# Patient Record
Sex: FEMALE | Race: WHITE | NOT HISPANIC OR LATINO | Employment: OTHER | URBAN - METROPOLITAN AREA
[De-identification: names, ages, dates, MRNs, and addresses within clinical notes are randomized per-mention and may not be internally consistent; named-entity substitution may affect disease eponyms.]

---

## 2017-05-04 ENCOUNTER — TRANSCRIBE ORDERS (OUTPATIENT)
Dept: ADMINISTRATIVE | Facility: HOSPITAL | Age: 82
End: 2017-05-04

## 2017-05-17 ENCOUNTER — GENERIC CONVERSION - ENCOUNTER (OUTPATIENT)
Dept: OTHER | Facility: OTHER | Age: 82
End: 2017-05-17

## 2017-05-17 DIAGNOSIS — I70.25 ATHEROSCLEROSIS OF NATIVE ARTERIES OF OTHER EXTREMITIES WITH ULCERATION (HCC): ICD-10-CM

## 2017-05-18 ENCOUNTER — HOSPITAL ENCOUNTER (OUTPATIENT)
Dept: NON INVASIVE DIAGNOSTICS | Facility: HOSPITAL | Age: 82
Discharge: HOME | End: 2017-05-18
Attending: SURGERY
Payer: MEDICARE

## 2017-05-18 DIAGNOSIS — I70.25 ATHEROSCLEROSIS OF NATIVE ARTERIES OF OTHER EXTREMITIES WITH ULCERATION (HCC): ICD-10-CM

## 2017-05-18 PROCEDURE — 93925 LOWER EXTREMITY STUDY: CPT

## 2017-05-18 PROCEDURE — 93923 UPR/LXTR ART STDY 3+ LVLS: CPT

## 2017-06-01 ENCOUNTER — GENERIC CONVERSION - ENCOUNTER (OUTPATIENT)
Dept: OTHER | Facility: OTHER | Age: 82
End: 2017-06-01

## 2017-07-03 ENCOUNTER — GENERIC CONVERSION - ENCOUNTER (OUTPATIENT)
Dept: OTHER | Facility: OTHER | Age: 82
End: 2017-07-03

## 2017-07-05 ENCOUNTER — GENERIC CONVERSION - ENCOUNTER (OUTPATIENT)
Dept: OTHER | Facility: OTHER | Age: 82
End: 2017-07-05

## 2017-07-24 ENCOUNTER — ALLSCRIPTS OFFICE VISIT (OUTPATIENT)
Dept: OTHER | Facility: OTHER | Age: 82
End: 2017-07-24

## 2017-11-24 DIAGNOSIS — I70.25 ATHEROSCLEROSIS OF NATIVE ARTERIES OF OTHER EXTREMITIES WITH ULCERATION (HCC): ICD-10-CM

## 2017-12-07 ENCOUNTER — GENERIC CONVERSION - ENCOUNTER (OUTPATIENT)
Dept: OTHER | Facility: OTHER | Age: 82
End: 2017-12-07

## 2017-12-07 ENCOUNTER — HOSPITAL ENCOUNTER (OUTPATIENT)
Dept: NON INVASIVE DIAGNOSTICS | Facility: HOSPITAL | Age: 82
Discharge: HOME | End: 2017-12-07
Attending: SURGERY
Payer: MEDICARE

## 2017-12-07 DIAGNOSIS — I70.25 ATHEROSCLEROSIS OF NATIVE ARTERIES OF OTHER EXTREMITIES WITH ULCERATION (HCC): ICD-10-CM

## 2017-12-07 PROCEDURE — 93923 UPR/LXTR ART STDY 3+ LVLS: CPT

## 2017-12-07 PROCEDURE — 93925 LOWER EXTREMITY STUDY: CPT

## 2018-01-11 NOTE — MISCELLANEOUS
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 (440 23,707 9) (I70 25)   3  Carotid artery stenosis (433 10) (I65 29)   4  Hypertension (401 9) (I10)   5  Hypothyroidism (244 9) (E03 9)   6  Peripheral vascular disease (443 9) (I73 9)   7  Vulvar lesion (624 8) (N90 89)    Current Meds   1  B-12 TABS; Therapy: (Kennth Buttery) to Recorded   2  Calcium 600 MG Oral Tablet; Therapy: (Kennth Buttery) to Recorded   3  Coumadin 5 MG Oral Tablet (Warfarin Sodium); Therapy: (Kennth Buttery) to Recorded   4  [place] TABS; Therapy: (Kennth Buttery) to Recorded   5  Multi-Vitamin TABS; Therapy: (Kennth Buttery) to Recorded   6  OxyCODONE HCl - 5 MG Oral Tablet; Therapy: (Kennth Buttery) to Recorded   7  Plavix 75 MG Oral Tablet (Clopidogrel Bisulfate); TAKE 1 TABLET DAILY; Therapy: (Kennth Buttery) to Recorded   8  PriLOSEC OTC 20 MG Oral Tablet Delayed Release; Therapy: (Kennth Buttery) to Recorded   9  Synthroid 50 MCG Oral Tablet (Levothyroxine Sodium); Therapy: (Kennth Buttery) to Recorded   10  Vitamin C 500 MG Oral Tablet; Therapy: (Kennth Buttery) to Recorded   11  Vitamin E 400 UNIT Oral Capsule; Therapy: (Kennth Buttery) to Recorded    Allergies    1  No Known Drug Allergies    Plan  Atherosclerosis of native artery of other extremity with ulceration    · VAS LOWER LIMB ARTERIAL DUPLEX, COMPLETE BILATERAL/GRAFTS;  SIDE:Bilateral; Status:Hold For - Scheduling,Retrospective By Protocol Authorization;   Requested for:[id number]; Signatures   Electronically signed by :  Donte Grace, ; May 17 2017  3:04PM EST                       (Author)

## 2018-01-11 NOTE — MISCELLANEOUS
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 (440 23,707 9) (I70 25)   3  Carotid artery stenosis (433 10) (I65 29)   4  Hypertension (401 9) (I10)   5  Hypothyroidism (244 9) (E03 9)   6  Peripheral vascular disease (443 9) (I73 9)   7  Vulvar lesion (624 8) (N90 89)    Current Meds   1  Aspirin 81 MG Oral Tablet Delayed Release; Therapy: (Recorded:01Jun2017) to Recorded   2  Calcium 500 TABS; Therapy: (Recorded:01Jun2017) to Recorded   3  Coumadin 2 5 MG Oral Tablet (Warfarin Sodium); Therapy: (Recorded:01Jun2017) to Recorded   4  Coumadin 5 MG Oral Tablet (Warfarin Sodium); Therapy: (Recorded:01Jun2017) to Recorded   5  Lexapro 5 MG Oral Tablet (Escitalopram Oxalate); Therapy: (Recorded:01Jun2017) to Recorded   6  Multi-Vitamin TABS; Therapy: (Nicholaus December) to Recorded   7  Omeprazole 20 MG Oral Capsule Delayed Release; Therapy: (Recorded:01Jun2017) to Recorded   8  Refresh SOLN;   Therapy: (Recorded:01Jun2017) to Recorded   9  Synthroid 50 MCG Oral Tablet (Levothyroxine Sodium); Therapy: (Recorded:01Jun2017) to Recorded   10  Ventolin 90 MCG/ACT AERS (Albuterol); Therapy: (Recorded:01Jun2017) to Recorded   11  Vitamin C 500 MG Oral Tablet; Therapy: (Nicholaus December) to Recorded   12  Vitamin E 400 UNIT Oral Capsule; Therapy: (Nicholaus December) to Recorded    Allergies    1  No Known Drug Allergies    Signatures   Electronically signed by :  Renate Mendoza, ; Jul 5 2017  3:56PM EST                       (Author)

## 2018-01-14 VITALS
HEART RATE: 76 BPM | WEIGHT: 107 LBS | BODY MASS INDEX: 18.96 KG/M2 | HEIGHT: 63 IN | DIASTOLIC BLOOD PRESSURE: 76 MMHG | RESPIRATION RATE: 16 BRPM | SYSTOLIC BLOOD PRESSURE: 116 MMHG

## 2018-01-14 NOTE — MISCELLANEOUS
Message   Recorded as Task   Date: 06/07/2016 01:17 PM, Created By: Nadira Han   Task Name: Follow Up   Assigned To: Herrera Rizzo   Regarding Patient: Stefani Monte, Status: Active   CommentAylin Peng - 07 Jun 2016 1:17 PM     TASK CREATED  Looks like she may have been on Plavix long term  She has a hx of falls and PCP is asking if we can dc Plavix? Pt is also on coumadin but for a-fib  Thanks! 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 (440 23,707 9) (I70 25)   3  Carotid artery stenosis (433 10) (I65 29)   4  Hypertension (401 9) (I10)   5  Hypothyroidism (244 9) (E03 9)   6  Peripheral vascular disease (443 9) (I73 9)    Current Meds   1  B-12 TABS; Therapy: (Enrikeie Ni) to Recorded   2  Calcium 600 MG Oral Tablet; Therapy: (Enrikeie Ni) to Recorded   3  Coumadin 5 MG Oral Tablet (Warfarin Sodium); Therapy: (Lossie Ni) to Recorded   4  [place] TABS; Therapy: (Lossie Ni) to Recorded   5  Multi-Vitamin TABS; Therapy: (Lossie Ni) to Recorded   6  OxyCODONE HCl - 5 MG Oral Tablet; Therapy: (Lossie Ni) to Recorded   7  Plavix 75 MG Oral Tablet (Clopidogrel Bisulfate); TAKE 1 TABLET DAILY; Therapy: (Lossie Ni) to Recorded   8  PriLOSEC OTC 20 MG Oral Tablet Delayed Release; Therapy: (Lossie Ni) to Recorded   9  Synthroid 50 MCG Oral Tablet (Levothyroxine Sodium); Therapy: (Terri Eckert) to Recorded   10  Vitamin C 500 MG Oral Tablet; Therapy: (Terri Eckert) to Recorded   11  Vitamin E 400 UNIT Oral Capsule; Therapy: (Terri Eckert) to Recorded    Allergies    1   No Known Drug Allergies    Signatures   Electronically signed by : Brittaney Urbina, ; Jun 14 2016  9:09AM EST                       (Author)

## 2018-01-18 NOTE — MISCELLANEOUS
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 (440 23,707 9) (I70 25)   3  Carotid artery stenosis (433 10) (I65 29)   4  Hypertension (401 9) (I10)   5  Hypothyroidism (244 9) (E03 9)   6  Peripheral vascular disease (443 9) (I73 9)   7  Vulvar lesion (624 8) (N90 89)    Current Meds   1  Aspirin 81 MG Oral Tablet Delayed Release; Therapy: (Recorded:01Jun2017) to Recorded   2  Calcium 500 TABS; Therapy: (Recorded:01Jun2017) to Recorded   3  Coumadin 2 5 MG Oral Tablet (Warfarin Sodium); Therapy: (Recorded:01Jun2017) to Recorded   4  Coumadin 5 MG Oral Tablet (Warfarin Sodium); Therapy: (Recorded:01Jun2017) to Recorded   5  Lexapro 5 MG Oral Tablet (Escitalopram Oxalate); Therapy: (Recorded:01Jun2017) to Recorded   6  Multi-Vitamin TABS; Therapy: ([place] Drop) to Recorded   7  Omeprazole 20 MG Oral Capsule Delayed Release; Therapy: (Recorded:01Jun2017) to Recorded   8  Refresh SOLN;   Therapy: (Recorded:01Jun2017) to Recorded   9  Synthroid 50 MCG Oral Tablet (Levothyroxine Sodium); Therapy: (Recorded:01Jun2017) to Recorded   10  Ventolin 90 MCG/ACT AERS (Albuterol); Therapy: (Recorded:01Jun2017) to Recorded   11  Vitamin C 500 MG Oral Tablet; Therapy: (Ramon Drop) to Recorded   12  Vitamin E 400 UNIT Oral Capsule; Therapy: (Ramon Drop) to Recorded    Allergies    1  No Known Drug Allergies    Signatures   Electronically signed by :  Gena Grace, ; Jul  3 2017  4:31PM EST                       (Author)

## 2018-01-22 VITALS
HEART RATE: 66 BPM | DIASTOLIC BLOOD PRESSURE: 80 MMHG | BODY MASS INDEX: 18.25 KG/M2 | HEIGHT: 63 IN | SYSTOLIC BLOOD PRESSURE: 120 MMHG | WEIGHT: 103 LBS | RESPIRATION RATE: 14 BRPM

## 2018-12-21 ENCOUNTER — TELEPHONE (OUTPATIENT)
Dept: ADMINISTRATIVE | Facility: HOSPITAL | Age: 83
End: 2018-12-21

## 2018-12-21 DIAGNOSIS — I70.232 ATHEROSCLEROSIS OF NATIVE ARTERY OF BOTH LOWER EXTREMITIES WITH BILATERAL ULCERATION OF CALVES (HCC): Primary | ICD-10-CM

## 2018-12-21 DIAGNOSIS — I70.242 ATHEROSCLEROSIS OF NATIVE ARTERY OF BOTH LOWER EXTREMITIES WITH BILATERAL ULCERATION OF CALVES (HCC): Primary | ICD-10-CM

## 2019-03-26 ENCOUNTER — HOSPITAL ENCOUNTER (OUTPATIENT)
Dept: NON INVASIVE DIAGNOSTICS | Facility: CLINIC | Age: 84
Discharge: HOME | End: 2019-03-26
Payer: MEDICARE

## 2019-03-26 DIAGNOSIS — I70.242 ATHEROSCLEROSIS OF NATIVE ARTERY OF BOTH LOWER EXTREMITIES WITH BILATERAL ULCERATION OF CALVES (HCC): ICD-10-CM

## 2019-03-26 DIAGNOSIS — I70.232 ATHEROSCLEROSIS OF NATIVE ARTERY OF BOTH LOWER EXTREMITIES WITH BILATERAL ULCERATION OF CALVES (HCC): ICD-10-CM

## 2019-03-26 PROCEDURE — 93925 LOWER EXTREMITY STUDY: CPT

## 2019-03-26 PROCEDURE — 93923 UPR/LXTR ART STDY 3+ LVLS: CPT

## 2019-03-28 PROCEDURE — 93925 LOWER EXTREMITY STUDY: CPT | Performed by: SURGERY

## 2019-03-28 PROCEDURE — 93922 UPR/L XTREMITY ART 2 LEVELS: CPT | Performed by: SURGERY

## 2019-03-29 ENCOUNTER — APPOINTMENT (EMERGENCY)
Dept: CT IMAGING | Facility: HOSPITAL | Age: 84
End: 2019-03-29
Payer: MEDICARE

## 2019-03-29 ENCOUNTER — HOSPITAL ENCOUNTER (EMERGENCY)
Facility: HOSPITAL | Age: 84
Discharge: HOME | End: 2019-03-29
Attending: EMERGENCY MEDICINE | Admitting: EMERGENCY MEDICINE
Payer: MEDICARE

## 2019-03-29 VITALS
OXYGEN SATURATION: 96 % | DIASTOLIC BLOOD PRESSURE: 84 MMHG | TEMPERATURE: 97.9 F | HEART RATE: 54 BPM | SYSTOLIC BLOOD PRESSURE: 190 MMHG | RESPIRATION RATE: 16 BRPM

## 2019-03-29 DIAGNOSIS — S00.01XA ABRASION OF SCALP, INITIAL ENCOUNTER: ICD-10-CM
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
## 2019-03-29 LAB
ANION GAP SERPL CALCULATED.3IONS-SCNC: 8 MMOL/L (ref 4–13)
APTT PPP: 38 SECONDS (ref 26–38)
BASOPHILS # BLD AUTO: 0.04 THOUSANDS/ΜL (ref 0–0.1)
BASOPHILS NFR BLD AUTO: 1 % (ref 0–1)
BUN SERPL-MCNC: 22 MG/DL (ref 5–25)
CALCIUM SERPL-MCNC: 8.8 MG/DL (ref 8.3–10.1)
CHLORIDE SERPL-SCNC: 103 MMOL/L (ref 100–108)
CO2 SERPL-SCNC: 30 MMOL/L (ref 21–32)
CREAT SERPL-MCNC: 1.42 MG/DL (ref 0.6–1.3)
EOSINOPHIL # BLD AUTO: 0.14 THOUSAND/ΜL (ref 0–0.61)
EOSINOPHIL NFR BLD AUTO: 3 % (ref 0–6)
ERYTHROCYTE [DISTWIDTH] IN BLOOD BY AUTOMATED COUNT: 17.1 % (ref 11.6–15.1)
GFR SERPL CREATININE-BSD FRML MDRD: 31 ML/MIN/1.73SQ M
GLUCOSE SERPL-MCNC: 92 MG/DL (ref 65–140)
HCT VFR BLD AUTO: 30.2 % (ref 34.8–46.1)
HGB BLD-MCNC: 9.5 G/DL (ref 11.5–15.4)
IMM GRANULOCYTES # BLD AUTO: 0.02 THOUSAND/UL (ref 0–0.2)
IMM GRANULOCYTES NFR BLD AUTO: 0 % (ref 0–2)
INR PPP: 2.82 (ref 0.86–1.17)
LYMPHOCYTES # BLD AUTO: 0.86 THOUSANDS/ΜL (ref 0.6–4.47)
LYMPHOCYTES NFR BLD AUTO: 15 % (ref 14–44)
MCH RBC QN AUTO: 27.5 PG (ref 26.8–34.3)
MCHC RBC AUTO-ENTMCNC: 31.5 G/DL (ref 31.4–37.4)
MCV RBC AUTO: 87 FL (ref 82–98)
MONOCYTES # BLD AUTO: 0.6 THOUSAND/ΜL (ref 0.17–1.22)
MONOCYTES NFR BLD AUTO: 11 % (ref 4–12)
NEUTROPHILS # BLD AUTO: 3.96 THOUSANDS/ΜL (ref 1.85–7.62)
NEUTS SEG NFR BLD AUTO: 70 % (ref 43–75)
NRBC BLD AUTO-RTO: 0 /100 WBCS
PLATELET # BLD AUTO: 235 THOUSANDS/UL (ref 149–390)
PMV BLD AUTO: 9.2 FL (ref 8.9–12.7)
POTASSIUM SERPL-SCNC: 4.2 MMOL/L (ref 3.5–5.3)
PROTHROMBIN TIME: 29.7 SECONDS (ref 11.8–14.2)
RBC # BLD AUTO: 3.46 MILLION/UL (ref 3.81–5.12)
SODIUM SERPL-SCNC: 141 MMOL/L (ref 136–145)
WBC # BLD AUTO: 5.62 THOUSAND/UL (ref 4.31–10.16)

## 2019-03-29 PROCEDURE — 36415 COLL VENOUS BLD VENIPUNCTURE: CPT | Performed by: EMERGENCY MEDICINE

## 2019-03-29 PROCEDURE — 80048 BASIC METABOLIC PNL TOTAL CA: CPT | Performed by: EMERGENCY MEDICINE

## 2019-03-29 PROCEDURE — 93005 ELECTROCARDIOGRAM TRACING: CPT

## 2019-03-29 PROCEDURE — 85025 COMPLETE CBC W/AUTO DIFF WBC: CPT | Performed by: EMERGENCY MEDICINE

## 2019-03-29 PROCEDURE — 85610 PROTHROMBIN TIME: CPT | Performed by: EMERGENCY MEDICINE

## 2019-03-29 PROCEDURE — 70450 CT HEAD/BRAIN W/O DYE: CPT

## 2019-03-29 PROCEDURE — 99284 EMERGENCY DEPT VISIT MOD MDM: CPT

## 2019-03-29 PROCEDURE — 72125 CT NECK SPINE W/O DYE: CPT

## 2019-03-29 PROCEDURE — 85730 THROMBOPLASTIN TIME PARTIAL: CPT | Performed by: EMERGENCY MEDICINE

## 2019-03-31 LAB
QRS AXIS: -57 DEGREES
QRSD INTERVAL: 78 MS
QT INTERVAL: 442 MS
QTC INTERVAL: 442 MS
T WAVE AXIS: 82 DEGREES
VENTRICULAR RATE: 60 BPM

## 2019-03-31 PROCEDURE — 93010 ELECTROCARDIOGRAM REPORT: CPT | Performed by: INTERNAL MEDICINE
